# Patient Record
Sex: FEMALE | Race: WHITE | NOT HISPANIC OR LATINO | Employment: FULL TIME | URBAN - METROPOLITAN AREA
[De-identification: names, ages, dates, MRNs, and addresses within clinical notes are randomized per-mention and may not be internally consistent; named-entity substitution may affect disease eponyms.]

---

## 2017-07-27 ENCOUNTER — HOSPITAL ENCOUNTER (INPATIENT)
Facility: HOSPITAL | Age: 52
LOS: 5 days | Discharge: HOME | End: 2017-08-01
Attending: SPECIALIST | Admitting: SPECIALIST

## 2017-07-27 ENCOUNTER — HOSPITAL ENCOUNTER (EMERGENCY)
Facility: HOSPITAL | Age: 52
Discharge: PSYCHIATRIC OR SUBSTANCE USE FACILITY | End: 2017-07-27
Attending: EMERGENCY MEDICINE | Admitting: EMERGENCY MEDICINE

## 2017-07-27 VITALS
TEMPERATURE: 97.6 F | OXYGEN SATURATION: 98 % | BODY MASS INDEX: 28.12 KG/M2 | HEIGHT: 66 IN | RESPIRATION RATE: 20 BRPM | HEART RATE: 117 BPM | DIASTOLIC BLOOD PRESSURE: 109 MMHG | SYSTOLIC BLOOD PRESSURE: 151 MMHG | WEIGHT: 175 LBS

## 2017-07-27 DIAGNOSIS — F31.13 BIPOLAR DISORDER WITH SEVERE MANIA (HCC): Primary | ICD-10-CM

## 2017-07-27 PROBLEM — F31.9 BIPOLAR DISORDER WITH PSYCHOTIC FEATURES (HCC): Status: ACTIVE | Noted: 2017-07-27

## 2017-07-27 LAB
ALBUMIN SERPL-MCNC: 4.5 G/DL (ref 3.5–5.2)
ALBUMIN SERPL-MCNC: 4.6 G/DL (ref 3.5–5.2)
ALBUMIN/GLOB SERPL: 1.8 G/DL
ALBUMIN/GLOB SERPL: 2 G/DL
ALP SERPL-CCNC: 60 U/L (ref 39–117)
ALP SERPL-CCNC: 60 U/L (ref 39–117)
ALT SERPL W P-5'-P-CCNC: 13 U/L (ref 1–33)
ALT SERPL W P-5'-P-CCNC: 14 U/L (ref 1–33)
AMPHET+METHAMPHET UR QL: NEGATIVE
ANION GAP SERPL CALCULATED.3IONS-SCNC: 16 MMOL/L
ANION GAP SERPL CALCULATED.3IONS-SCNC: 16.1 MMOL/L
APAP SERPL-MCNC: <5 MCG/ML (ref 10–30)
AST SERPL-CCNC: 15 U/L (ref 1–32)
AST SERPL-CCNC: 16 U/L (ref 1–32)
BARBITURATES UR QL SCN: NEGATIVE
BASOPHILS # BLD AUTO: 0.03 10*3/MM3 (ref 0–0.2)
BASOPHILS # BLD AUTO: 0.03 10*3/MM3 (ref 0–0.2)
BASOPHILS NFR BLD AUTO: 0.3 % (ref 0–1.5)
BASOPHILS NFR BLD AUTO: 0.3 % (ref 0–1.5)
BENZODIAZ UR QL SCN: NEGATIVE
BILIRUB SERPL-MCNC: 0.4 MG/DL (ref 0.1–1.2)
BILIRUB SERPL-MCNC: 0.4 MG/DL (ref 0.1–1.2)
BILIRUB UR QL STRIP: NEGATIVE
BUN BLD-MCNC: 6 MG/DL (ref 6–20)
BUN BLD-MCNC: 6 MG/DL (ref 6–20)
BUN/CREAT SERPL: 6.2 (ref 7–25)
BUN/CREAT SERPL: 6.3 (ref 7–25)
CALCIUM SPEC-SCNC: 8.9 MG/DL (ref 8.6–10.5)
CALCIUM SPEC-SCNC: 9.1 MG/DL (ref 8.6–10.5)
CANNABINOIDS SERPL QL: NEGATIVE
CHLORIDE SERPL-SCNC: 104 MMOL/L (ref 98–107)
CHLORIDE SERPL-SCNC: 104 MMOL/L (ref 98–107)
CLARITY UR: CLEAR
CO2 SERPL-SCNC: 21.9 MMOL/L (ref 22–29)
CO2 SERPL-SCNC: 22 MMOL/L (ref 22–29)
COCAINE UR QL: NEGATIVE
COLOR UR: YELLOW
CREAT BLD-MCNC: 0.96 MG/DL (ref 0.57–1)
CREAT BLD-MCNC: 0.97 MG/DL (ref 0.57–1)
DEPRECATED RDW RBC AUTO: 42.6 FL (ref 37–54)
DEPRECATED RDW RBC AUTO: 42.9 FL (ref 37–54)
EOSINOPHIL # BLD AUTO: 0.07 10*3/MM3 (ref 0–0.7)
EOSINOPHIL # BLD AUTO: 0.11 10*3/MM3 (ref 0–0.7)
EOSINOPHIL NFR BLD AUTO: 0.8 % (ref 0.3–6.2)
EOSINOPHIL NFR BLD AUTO: 1.3 % (ref 0.3–6.2)
ERYTHROCYTE [DISTWIDTH] IN BLOOD BY AUTOMATED COUNT: 12.5 % (ref 11.7–13)
ERYTHROCYTE [DISTWIDTH] IN BLOOD BY AUTOMATED COUNT: 12.5 % (ref 11.7–13)
ETHANOL BLD-MCNC: <10 MG/DL (ref 0–10)
ETHANOL UR QL: <0.01 %
GFR SERPL CREATININE-BSD FRML MDRD: 61 ML/MIN/1.73
GFR SERPL CREATININE-BSD FRML MDRD: 61 ML/MIN/1.73
GLOBULIN UR ELPH-MCNC: 2.3 GM/DL
GLOBULIN UR ELPH-MCNC: 2.5 GM/DL
GLUCOSE BLD-MCNC: 125 MG/DL (ref 65–99)
GLUCOSE BLD-MCNC: 125 MG/DL (ref 65–99)
GLUCOSE UR STRIP-MCNC: NEGATIVE MG/DL
HCT VFR BLD AUTO: 44.1 % (ref 35.6–45.5)
HCT VFR BLD AUTO: 44.2 % (ref 35.6–45.5)
HGB BLD-MCNC: 15.1 G/DL (ref 11.9–15.5)
HGB BLD-MCNC: 15.3 G/DL (ref 11.9–15.5)
HGB UR QL STRIP.AUTO: NEGATIVE
HOLD SPECIMEN: NORMAL
HOLD SPECIMEN: NORMAL
IMM GRANULOCYTES # BLD: 0 10*3/MM3 (ref 0–0.03)
IMM GRANULOCYTES # BLD: 0 10*3/MM3 (ref 0–0.03)
IMM GRANULOCYTES NFR BLD: 0 % (ref 0–0.5)
IMM GRANULOCYTES NFR BLD: 0 % (ref 0–0.5)
KETONES UR QL STRIP: NEGATIVE
LEUKOCYTE ESTERASE UR QL STRIP.AUTO: NEGATIVE
LYMPHOCYTES # BLD AUTO: 1.54 10*3/MM3 (ref 0.9–4.8)
LYMPHOCYTES # BLD AUTO: 1.61 10*3/MM3 (ref 0.9–4.8)
LYMPHOCYTES NFR BLD AUTO: 17.5 % (ref 19.6–45.3)
LYMPHOCYTES NFR BLD AUTO: 18.5 % (ref 19.6–45.3)
MCH RBC QN AUTO: 32 PG (ref 26.9–32)
MCH RBC QN AUTO: 32.4 PG (ref 26.9–32)
MCHC RBC AUTO-ENTMCNC: 34.2 G/DL (ref 32.4–36.3)
MCHC RBC AUTO-ENTMCNC: 34.7 G/DL (ref 32.4–36.3)
MCV RBC AUTO: 93.4 FL (ref 80.5–98.2)
MCV RBC AUTO: 93.6 FL (ref 80.5–98.2)
METHADONE UR QL SCN: NEGATIVE
MONOCYTES # BLD AUTO: 0.97 10*3/MM3 (ref 0.2–1.2)
MONOCYTES # BLD AUTO: 1.01 10*3/MM3 (ref 0.2–1.2)
MONOCYTES NFR BLD AUTO: 11.1 % (ref 5–12)
MONOCYTES NFR BLD AUTO: 11.5 % (ref 5–12)
NEUTROPHILS # BLD AUTO: 6.04 10*3/MM3 (ref 1.9–8.1)
NEUTROPHILS # BLD AUTO: 6.11 10*3/MM3 (ref 1.9–8.1)
NEUTROPHILS NFR BLD AUTO: 69.3 % (ref 42.7–76)
NEUTROPHILS NFR BLD AUTO: 69.4 % (ref 42.7–76)
NITRITE UR QL STRIP: NEGATIVE
OPIATES UR QL: NEGATIVE
OXYCODONE UR QL SCN: NEGATIVE
PH UR STRIP.AUTO: 6.5 [PH] (ref 5–8)
PLATELET # BLD AUTO: 231 10*3/MM3 (ref 140–500)
PLATELET # BLD AUTO: 238 10*3/MM3 (ref 140–500)
PMV BLD AUTO: 10.3 FL (ref 6–12)
PMV BLD AUTO: 10.4 FL (ref 6–12)
POTASSIUM BLD-SCNC: 3.3 MMOL/L (ref 3.5–5.2)
POTASSIUM BLD-SCNC: 3.4 MMOL/L (ref 3.5–5.2)
PROT SERPL-MCNC: 6.9 G/DL (ref 6–8.5)
PROT SERPL-MCNC: 7 G/DL (ref 6–8.5)
PROT UR QL STRIP: NEGATIVE
RBC # BLD AUTO: 4.72 10*6/MM3 (ref 3.9–5.2)
RBC # BLD AUTO: 4.72 10*6/MM3 (ref 3.9–5.2)
SALICYLATES SERPL-MCNC: <0.3 MG/DL
SODIUM BLD-SCNC: 142 MMOL/L (ref 136–145)
SODIUM BLD-SCNC: 142 MMOL/L (ref 136–145)
SP GR UR STRIP: 1.01 (ref 1–1.03)
T4 FREE SERPL-MCNC: 1.29 NG/DL (ref 0.93–1.7)
TSH SERPL DL<=0.05 MIU/L-ACNC: 4.68 MIU/ML (ref 0.27–4.2)
UROBILINOGEN UR QL STRIP: NORMAL
WBC NRBC COR # BLD: 8.72 10*3/MM3 (ref 4.5–10.7)
WBC NRBC COR # BLD: 8.8 10*3/MM3 (ref 4.5–10.7)
WHOLE BLOOD HOLD SPECIMEN: NORMAL
WHOLE BLOOD HOLD SPECIMEN: NORMAL

## 2017-07-27 PROCEDURE — 84439 ASSAY OF FREE THYROXINE: CPT | Performed by: SPECIALIST

## 2017-07-27 PROCEDURE — 81003 URINALYSIS AUTO W/O SCOPE: CPT | Performed by: SPECIALIST

## 2017-07-27 PROCEDURE — 84443 ASSAY THYROID STIM HORMONE: CPT | Performed by: SPECIALIST

## 2017-07-27 RX ORDER — ZIPRASIDONE MESYLATE 20 MG/ML
10 INJECTION, POWDER, LYOPHILIZED, FOR SOLUTION INTRAMUSCULAR EVERY 6 HOURS PRN
Status: DISCONTINUED | OUTPATIENT
Start: 2017-07-27 | End: 2017-08-01 | Stop reason: HOSPADM

## 2017-07-27 RX ORDER — ACETAMINOPHEN 325 MG/1
650 TABLET ORAL EVERY 6 HOURS PRN
Status: DISCONTINUED | OUTPATIENT
Start: 2017-07-27 | End: 2017-08-01 | Stop reason: HOSPADM

## 2017-07-27 RX ORDER — ZIPRASIDONE MESYLATE 20 MG/ML
INJECTION, POWDER, LYOPHILIZED, FOR SOLUTION INTRAMUSCULAR
Status: COMPLETED
Start: 2017-07-27 | End: 2017-07-27

## 2017-07-27 RX ORDER — ZIPRASIDONE MESYLATE 20 MG/ML
10 INJECTION, POWDER, LYOPHILIZED, FOR SOLUTION INTRAMUSCULAR ONCE
Status: COMPLETED | OUTPATIENT
Start: 2017-07-27 | End: 2017-07-27

## 2017-07-27 RX ORDER — ALUMINA, MAGNESIA, AND SIMETHICONE 2400; 2400; 240 MG/30ML; MG/30ML; MG/30ML
15 SUSPENSION ORAL EVERY 6 HOURS PRN
Status: DISCONTINUED | OUTPATIENT
Start: 2017-07-27 | End: 2017-08-01 | Stop reason: HOSPADM

## 2017-07-27 RX ORDER — ALUMINA, MAGNESIA, AND SIMETHICONE 2400; 2400; 240 MG/30ML; MG/30ML; MG/30ML
15 SUSPENSION ORAL ONCE
Status: COMPLETED | OUTPATIENT
Start: 2017-07-27 | End: 2017-07-27

## 2017-07-27 RX ADMIN — ZIPRASIDONE MESYLATE 10 MG: 20 INJECTION, POWDER, LYOPHILIZED, FOR SOLUTION INTRAMUSCULAR at 13:42

## 2017-07-27 RX ADMIN — LIDOCAINE HYDROCHLORIDE 15 ML: 20 SOLUTION ORAL; TOPICAL at 15:14

## 2017-07-27 RX ADMIN — ALUMINUM HYDROXIDE, MAGNESIUM HYDROXIDE, AND DIMETHICONE 15 ML: 400; 400; 40 SUSPENSION ORAL at 15:14

## 2017-07-28 RX ORDER — LAMOTRIGINE 25 MG/1
25 TABLET ORAL NIGHTLY
Status: DISCONTINUED | OUTPATIENT
Start: 2017-07-28 | End: 2017-08-01 | Stop reason: HOSPADM

## 2017-07-28 RX ORDER — ZIPRASIDONE HYDROCHLORIDE 20 MG/1
20 CAPSULE ORAL 2 TIMES DAILY WITH MEALS
Status: DISCONTINUED | OUTPATIENT
Start: 2017-07-28 | End: 2017-07-29

## 2017-07-28 RX ORDER — POTASSIUM CHLORIDE 1.5 G/1.77G
40 POWDER, FOR SOLUTION ORAL ONCE
Status: COMPLETED | OUTPATIENT
Start: 2017-07-28 | End: 2017-07-28

## 2017-07-28 RX ADMIN — ZIPRASIDONE HYDROCHLORIDE 20 MG: 20 CAPSULE ORAL at 13:30

## 2017-07-28 RX ADMIN — LAMOTRIGINE 25 MG: 25 TABLET ORAL at 21:18

## 2017-07-28 RX ADMIN — POTASSIUM CHLORIDE 40 MEQ: 1.5 POWDER, FOR SOLUTION ORAL at 08:21

## 2017-07-28 RX ADMIN — ZIPRASIDONE HYDROCHLORIDE 20 MG: 20 CAPSULE ORAL at 17:04

## 2017-07-29 LAB
CHOLEST SERPL-MCNC: 184 MG/DL (ref 0–200)
HDLC SERPL-MCNC: 49 MG/DL (ref 40–60)
LDLC SERPL CALC-MCNC: 114 MG/DL (ref 0–100)
LDLC/HDLC SERPL: 2.33 {RATIO}
TRIGL SERPL-MCNC: 104 MG/DL (ref 0–150)
VLDLC SERPL-MCNC: 20.8 MG/DL (ref 5–40)

## 2017-07-29 PROCEDURE — 80061 LIPID PANEL: CPT | Performed by: SPECIALIST

## 2017-07-29 RX ORDER — ZIPRASIDONE HYDROCHLORIDE 40 MG/1
40 CAPSULE ORAL 2 TIMES DAILY WITH MEALS
Status: DISCONTINUED | OUTPATIENT
Start: 2017-07-29 | End: 2017-07-30

## 2017-07-29 RX ADMIN — ZIPRASIDONE HYDROCHLORIDE 20 MG: 20 CAPSULE ORAL at 09:16

## 2017-07-29 RX ADMIN — ZIPRASIDONE HCL 40 MG: 40 CAPSULE ORAL at 17:38

## 2017-07-29 RX ADMIN — LAMOTRIGINE 25 MG: 25 TABLET ORAL at 20:56

## 2017-07-29 RX ADMIN — ACETAMINOPHEN 650 MG: 325 TABLET ORAL at 04:13

## 2017-07-30 RX ORDER — ZIPRASIDONE HYDROCHLORIDE 60 MG/1
60 CAPSULE ORAL 2 TIMES DAILY WITH MEALS
Status: DISCONTINUED | OUTPATIENT
Start: 2017-07-30 | End: 2017-08-01 | Stop reason: HOSPADM

## 2017-07-30 RX ADMIN — ZIPRASIDONE HYDROCHLORIDE 60 MG: 60 CAPSULE ORAL at 17:53

## 2017-07-30 RX ADMIN — LAMOTRIGINE 25 MG: 25 TABLET ORAL at 20:51

## 2017-07-30 RX ADMIN — ZIPRASIDONE HCL 40 MG: 40 CAPSULE ORAL at 08:37

## 2017-07-31 RX ADMIN — ZIPRASIDONE HYDROCHLORIDE 60 MG: 60 CAPSULE ORAL at 08:27

## 2017-07-31 RX ADMIN — ZIPRASIDONE HYDROCHLORIDE 60 MG: 60 CAPSULE ORAL at 17:40

## 2017-07-31 RX ADMIN — LAMOTRIGINE 25 MG: 25 TABLET ORAL at 21:38

## 2017-08-01 VITALS
BODY MASS INDEX: 26.84 KG/M2 | TEMPERATURE: 97.1 F | OXYGEN SATURATION: 96 % | RESPIRATION RATE: 16 BRPM | SYSTOLIC BLOOD PRESSURE: 140 MMHG | HEART RATE: 114 BPM | WEIGHT: 151.5 LBS | DIASTOLIC BLOOD PRESSURE: 94 MMHG | HEIGHT: 63 IN

## 2017-08-01 RX ORDER — LAMOTRIGINE 25 MG/1
25 TABLET ORAL NIGHTLY
Qty: 120 TABLET | Refills: 1 | Status: SHIPPED | OUTPATIENT
Start: 2017-08-01

## 2017-08-01 RX ORDER — ZIPRASIDONE HYDROCHLORIDE 60 MG/1
60 CAPSULE ORAL 2 TIMES DAILY WITH MEALS
Qty: 60 CAPSULE | Refills: 1 | Status: SHIPPED | OUTPATIENT
Start: 2017-08-01

## 2017-08-01 RX ADMIN — ZIPRASIDONE HYDROCHLORIDE 60 MG: 60 CAPSULE ORAL at 08:27

## 2017-08-01 NOTE — SIGNIFICANT NOTE
Met with pt,  (Michelet), 36 year old daughter (Oneyda) and 31 year old son (Omar) for family session. Pt shared lives with , son and sometimes another daughter and her brother. Pt described how stress and not taking meds lead-up to need to be in the hospital. Pt was encouraged to stay on meds as prescribed. Discussed the illness of bipolar and normalized the need for body chemicals to be balanced with help of meds, exercise, and connections. Family shared of how family gathers for meals, games and talking with each other on weekly basis. Pt voiced interest in doing out pt therapy at [facility] since that would be convenient and shared desire to follow-up with Dr. Foster for meds. Pt shared feeling ready for discharge. Family shared pt had made a 180 degree turn from how was before coming to the hospital. Pt shared desire to go out to eat with family for lunch.

## 2017-08-01 NOTE — PLAN OF CARE
Problem:  Patient Care Overview (Adult)  Goal: Discharge Needs Assessment  Outcome: Ongoing (interventions implemented as appropriate)    07/28/17 1543 08/01/17 1035   Discharge Needs Assessment   Concerns To Be Addressed --  coping/stress concerns;mental health concerns   Readmission Within The Last 30 Days --  no previous admission in last 30 days   Discharge Planning Comments --  Pt. denies any current outpatient providers at the present time.  will conduct a follow-up with the treatment team on 07/31/17. Pt. was unable to process during initial meeting with the . Social will follow-up with the patient to determine after-care follow-up.   Living Environment   Transportation Available family or friend will provide --

## 2017-08-01 NOTE — PLAN OF CARE
Problem:  Patient Care Overview (Adult)  Goal: Plan of Care Review  Outcome: Ongoing (interventions implemented as appropriate)    07/31/17 1222 07/31/17 2245 08/01/17 0452   Patient Care Overview   Progress improving --  --    Outcome Evaluation   Outcome Summary/Follow up Plan --  --  Pt compliant with medications. She rates anxiety 5/10, denies depression, SI, HI, hallucinations and pain. She states she is feeling better. Will continue to monitor this shift.   Coping/Psychosocial Response Interventions   Plan Of Care Reviewed With --  patient --    Coping/Psychosocial   Patient Agreement with Plan of Care --  agrees --          Problem:  Overarching Goals  Goal: Adheres to Safety Considerations for Self and Others  Outcome: Ongoing (interventions implemented as appropriate)  Goal: Optimized Coping Skills in Response to Life Stressors  Outcome: Ongoing (interventions implemented as appropriate)  Goal: Develops/Participates in Therapeutic [place] to Support Successful Transition  Outcome: Ongoing (interventions implemented as appropriate)

## 2017-08-01 NOTE — DISCHARGE SUMMARY
"DATES OF ADMISSION: 7/27/2017-8/1/2017    REASON FOR ADMISSION: The patient is a 52-year-old white female with a history of bipolar disorder admitted in a manic phase    LABS:  See chart    HOSPITAL COURSE:  The patient was admitted to the [facility] and placed on suicide precautions.  She admitted that she had stopped her medications after friends a told her contracted deal with her bipolar illness \"through [facility]\".  She agreed to initiation of Geodon and Lamictal and these medications were restarted.  Geodon was titrated to a final dose of 60 mg twice daily.  The patient showed rapid response to this medication change and by 8/1 had had a good family session.  She was agreeable with the plan for follow-up in the intensive outpatient program and discharge was ordered.    FINAL DIAGNOSIS:  Bipolar disorder most recent episode manic.    DISPOSITION ON DISCHARGE:  The patient will follow in the intensive outpatient program and through the auspices of [facility].    PROGNOSIS: Good     Tonia Mir   Home Medication Instructions LEXI:[id number]    Printed on:08/01/17 0931   Medication Information                      lamoTRIgine (LaMICtal) 25 MG tablet  Take 1 tablet by mouth Every Night.             ziprasidone (GEODON) 60 MG capsule  Take 1 capsule by mouth 2 (Two) Times a Day With Meals.                     "

## 2017-08-02 ENCOUNTER — TELEPHONE (OUTPATIENT)
Dept: PSYCHIATRY | Facility: HOSPITAL | Age: 52
End: 2017-08-02

## 2017-08-03 NOTE — PROGRESS NOTES
Continued Stay Note  [facility]     Patient Name: Tonia Mir  MRN: [id number]  Today's Date: 8/3/2017    Admit Date: 7/27/2017          Discharge Plan       08/03/17 1340    Final Note    Final Note MARIA ALEJANDRA received voice message from staff at [facility] regarding pt.'s appointment with Dr. Mitchell Foster on 08/30 at 2:00p. MARIA ALEJANDRA spoke with pt. via phone to inform of appt. date and time.               Discharge Codes     None        Expected Discharge Date and Time     Expected Discharge Date Expected Discharge Time    Aug 1, 2017             Telma Marrufo LCSW